# Patient Record
Sex: FEMALE | Race: WHITE | NOT HISPANIC OR LATINO | Employment: FULL TIME | URBAN - METROPOLITAN AREA
[De-identification: names, ages, dates, MRNs, and addresses within clinical notes are randomized per-mention and may not be internally consistent; named-entity substitution may affect disease eponyms.]

---

## 2019-06-19 ENCOUNTER — TELEPHONE (OUTPATIENT)
Dept: PEDIATRIC CARDIOLOGY | Facility: CLINIC | Age: 34
End: 2019-06-19

## 2019-07-15 ENCOUNTER — TELEPHONE (OUTPATIENT)
Dept: PEDIATRIC CARDIOLOGY | Facility: CLINIC | Age: 34
End: 2019-07-15

## 2019-07-15 NOTE — TELEPHONE ENCOUNTER
Contact: Bisi Woods-Pierron    Called to confirm patient's appointment with Dr. Santamaria/Fetal ECHO on 7/16/2019 at 1 pm. No answer/busy x 3.

## 2019-07-16 ENCOUNTER — OFFICE VISIT (OUTPATIENT)
Dept: PEDIATRIC CARDIOLOGY | Facility: CLINIC | Age: 34
End: 2019-07-16
Payer: COMMERCIAL

## 2019-07-16 ENCOUNTER — CLINICAL SUPPORT (OUTPATIENT)
Dept: PEDIATRIC CARDIOLOGY | Facility: CLINIC | Age: 34
End: 2019-07-16
Attending: OBSTETRICS & GYNECOLOGY
Payer: COMMERCIAL

## 2019-07-16 VITALS
SYSTOLIC BLOOD PRESSURE: 113 MMHG | DIASTOLIC BLOOD PRESSURE: 67 MMHG | BODY MASS INDEX: 26.39 KG/M2 | WEIGHT: 184.31 LBS | HEIGHT: 70 IN | OXYGEN SATURATION: 100 % | HEART RATE: 69 BPM

## 2019-07-16 DIAGNOSIS — E03.9 HYPOTHYROIDISM, UNSPECIFIED TYPE: Primary | ICD-10-CM

## 2019-07-16 DIAGNOSIS — O24.912 DIABETES MELLITUS AFFECTING PREGNANCY IN SECOND TRIMESTER: ICD-10-CM

## 2019-07-16 DIAGNOSIS — O24.012 PRE-EXISTING TYPE 1 DIABETES MELLITUS DURING PREGNANCY IN SECOND TRIMESTER: ICD-10-CM

## 2019-07-16 PROCEDURE — 76827 ECHO EXAM OF FETAL HEART: CPT | Mod: S$GLB,,, | Performed by: PEDIATRICS

## 2019-07-16 PROCEDURE — 99242 PR OFFICE CONSULTATION,LEVEL II: ICD-10-PCS | Mod: 25,S$GLB,, | Performed by: PEDIATRICS

## 2019-07-16 PROCEDURE — 99242 OFF/OP CONSLTJ NEW/EST SF 20: CPT | Mod: 25,S$GLB,, | Performed by: PEDIATRICS

## 2019-07-16 PROCEDURE — 93325 PR DOPPLER COLOR FLOW VELOCITY MAP: ICD-10-PCS | Mod: S$GLB,,, | Performed by: PEDIATRICS

## 2019-07-16 PROCEDURE — 76827 PR  SO2 FETAL HEART DOPPLER: ICD-10-PCS | Mod: S$GLB,,, | Performed by: PEDIATRICS

## 2019-07-16 PROCEDURE — 93325 DOPPLER ECHO COLOR FLOW MAPG: CPT | Mod: S$GLB,,, | Performed by: PEDIATRICS

## 2019-07-16 PROCEDURE — 76825 ECHO EXAM OF FETAL HEART: CPT | Mod: S$GLB,,, | Performed by: PEDIATRICS

## 2019-07-16 PROCEDURE — 76825 PR  SO2 FETAL HEART: ICD-10-PCS | Mod: S$GLB,,, | Performed by: PEDIATRICS

## 2019-07-16 RX ORDER — FOLIC ACID 0.4 MG
400 TABLET ORAL DAILY
COMMUNITY
End: 2019-12-02

## 2019-07-16 RX ORDER — NAPROXEN SODIUM 220 MG/1
81 TABLET, FILM COATED ORAL DAILY
COMMUNITY
End: 2019-12-02

## 2019-07-16 NOTE — PROGRESS NOTES
"Ms. Stone  is a 34 y.o. year old  , referred by Dr. Redding because of the history of diabetes mellitus and hypothyroidism.    The patient presented at approximately 25 1/7 weeks gestation (Patient's last menstrual period was 2018 (approximate).).  The patient denied any complaints.    Past medical history: Significant for type I DM and hypothyroidism.  Past surgical history: S/P tonsillectomy, S/P C/S x 1.  Past gestational history: S/P spontaneous  x 1.  The patient has a healthy daughter (3 y/o).    Family history: Negative for congenital heart disease, and sudden death during childhood.    Medications:   Outpatient Encounter Medications as of 2019   Medication Sig Dispense Refill    aspirin 81 MG Chew Take 81 mg by mouth once daily.      cholecalciferol, vitamin D3, 2,000 unit Cap Take 1 capsule by mouth once daily.      folic acid (FOLVITE) 400 MCG tablet Take 400 mcg by mouth once daily.      insulin aspart U-100 (NOVOLOG U-100 INSULIN ASPART) 100 unit/mL injection Inject 50 Units into the skin.      levothyroxine (SYNTHROID) 50 MCG tablet Take 75 mcg by mouth once daily.       ondansetron (ZOFRAN ODT) 4 MG TbDL Take 2 tablets (8 mg total) by mouth every 12 (twelve) hours. 20 tablet 6    BLISOVI FE , 28, 1 mg-20 mcg (21)/75 mg (7) per tablet Take 1 tablet by mouth once daily.  2     No facility-administered encounter medications on file as of 2019.        Allergies: Patient has no known allergies.    Blood pressure 113/67, pulse 69, height 5' 10" (1.778 m), weight 83.6 kg (184 lb 4.9 oz), last menstrual period 2018, SpO2 100 %, unknown if currently breastfeeding.    Fetal echocardiogram revealed a four chamber fetal heart with situs solitus.  The ventricles appeared to be equal in size.  The contractility of both ventricles was good.  The fetal heart rate was within the normal range, and regular.  The interventricular septum appeared to be intact.  There " were normally related great arteries seen.  The ductal and aortic arch were well visualized, and appeared to be widely patent.  There was no pleural or pericardial effusion seen.    Doppler analysis revealed a three vessel umbilical cord, with normal flow patterns, and velocities by Doppler.  There was a normal flow pattern seen in the ductus venosus.  There was evidence of normal systemic, and pulmonary venous return seen.  There was a normal right to left shunt seen across the foramen ovale.  There were normal inflow patterns seen across the AV-valves, without significant insufficiency.  There was no ventricular level shunt seen.  The right and left ventricular outflow tract, and ductal and aortic arch appeared to be unobstructed.    Impression:  It is our impression that Ms. Stone had a normal fetal echocardiogram.  As you know, small defects, and coarctation of the aorta cannot always be ruled out on fetal echocardiogram.  We discussed our findings with the patient, reviewed our images, and answered her questions. We also discussed the limitations of fetal echocardiography, but emphasized that her child appears to have normal cardiac anatomy.  No further follow up is scheduled in our clinic, but, of course, we will always be available to reevaluate this patient, if needed.    The above information was discussed in detail including the use of diagrams, with 30 minutes of total face to face time, with greater than 50% with counseling and coordination of care.  The discussion of the diagnosis and treatment options is as described above.      Time spent: 30 minutes, 50% dedicated to counseling.

## 2019-08-29 PROBLEM — E10.9 DM (DIABETES MELLITUS), TYPE 1: Status: ACTIVE | Noted: 2019-08-29

## 2019-09-05 PROBLEM — O24.919 DIABETES MELLITUS AFFECTING PREGNANCY: Status: ACTIVE | Noted: 2019-09-05

## 2019-09-12 PROBLEM — Z36.89 NST (NON-STRESS TEST) REACTIVE: Status: ACTIVE | Noted: 2019-09-12

## 2019-10-21 PROBLEM — O34.211 ENCOUNTER FOR MATERNAL CARE FOR LOW TRANSVERSE SCAR FROM PREVIOUS CESAREAN DELIVERY: Status: ACTIVE | Noted: 2019-10-21

## 2020-05-05 DIAGNOSIS — Z01.84 ANTIBODY RESPONSE EXAMINATION: ICD-10-CM

## 2021-05-04 ENCOUNTER — PATIENT MESSAGE (OUTPATIENT)
Dept: RESEARCH | Facility: HOSPITAL | Age: 36
End: 2021-05-04

## 2021-12-06 ENCOUNTER — TELEPHONE (OUTPATIENT)
Dept: FAMILY MEDICINE | Facility: CLINIC | Age: 36
End: 2021-12-06
Payer: COMMERCIAL

## 2021-12-06 RX ORDER — BENZONATATE 100 MG/1
100 CAPSULE ORAL 3 TIMES DAILY PRN
Qty: 30 CAPSULE | Refills: 0 | Status: SHIPPED | OUTPATIENT
Start: 2021-12-06 | End: 2021-12-16

## 2022-08-15 DIAGNOSIS — O24.912 DIABETES MELLITUS AFFECTING PREGNANCY IN SECOND TRIMESTER: Primary | ICD-10-CM

## 2022-09-09 ENCOUNTER — CLINICAL SUPPORT (OUTPATIENT)
Dept: PEDIATRIC CARDIOLOGY | Facility: CLINIC | Age: 37
End: 2022-09-09
Payer: COMMERCIAL

## 2022-09-09 ENCOUNTER — OFFICE VISIT (OUTPATIENT)
Dept: PEDIATRIC CARDIOLOGY | Facility: CLINIC | Age: 37
End: 2022-09-09
Attending: PEDIATRICS
Payer: COMMERCIAL

## 2022-09-09 VITALS
SYSTOLIC BLOOD PRESSURE: 112 MMHG | BODY MASS INDEX: 27.77 KG/M2 | HEART RATE: 81 BPM | OXYGEN SATURATION: 98 % | HEIGHT: 70 IN | DIASTOLIC BLOOD PRESSURE: 60 MMHG | WEIGHT: 194 LBS

## 2022-09-09 DIAGNOSIS — O24.012 PRE-EXISTING TYPE 1 DIABETES MELLITUS DURING PREGNANCY IN SECOND TRIMESTER: Primary | ICD-10-CM

## 2022-09-09 DIAGNOSIS — O24.912 DIABETES MELLITUS AFFECTING PREGNANCY IN SECOND TRIMESTER: ICD-10-CM

## 2022-09-09 PROCEDURE — 3066F PR DOCUMENTATION OF TREATMENT FOR NEPHROPATHY: ICD-10-PCS | Mod: CPTII,S$GLB,, | Performed by: PEDIATRICS

## 2022-09-09 PROCEDURE — 3074F SYST BP LT 130 MM HG: CPT | Mod: CPTII,S$GLB,, | Performed by: PEDIATRICS

## 2022-09-09 PROCEDURE — 99999 PR PBB SHADOW E&M-EST. PATIENT-LVL I: CPT | Mod: PBBFAC,,,

## 2022-09-09 PROCEDURE — 99999 PR PBB SHADOW E&M-EST. PATIENT-LVL I: ICD-10-PCS | Mod: PBBFAC,,,

## 2022-09-09 PROCEDURE — 93325 PR DOPPLER COLOR FLOW VELOCITY MAP: ICD-10-PCS | Mod: S$GLB,,, | Performed by: PEDIATRICS

## 2022-09-09 PROCEDURE — 76825 ECHO EXAM OF FETAL HEART: CPT | Mod: S$GLB,,, | Performed by: PEDIATRICS

## 2022-09-09 PROCEDURE — 3044F HG A1C LEVEL LT 7.0%: CPT | Mod: CPTII,S$GLB,, | Performed by: PEDIATRICS

## 2022-09-09 PROCEDURE — 3061F NEG MICROALBUMINURIA REV: CPT | Mod: CPTII,S$GLB,, | Performed by: PEDIATRICS

## 2022-09-09 PROCEDURE — 3066F NEPHROPATHY DOC TX: CPT | Mod: CPTII,S$GLB,, | Performed by: PEDIATRICS

## 2022-09-09 PROCEDURE — 3061F PR NEG MICROALBUMINURIA RESULT DOCUMENTED/REVIEW: ICD-10-PCS | Mod: CPTII,S$GLB,, | Performed by: PEDIATRICS

## 2022-09-09 PROCEDURE — 93325 DOPPLER ECHO COLOR FLOW MAPG: CPT | Mod: S$GLB,,, | Performed by: PEDIATRICS

## 2022-09-09 PROCEDURE — 1159F MED LIST DOCD IN RCRD: CPT | Mod: CPTII,S$GLB,, | Performed by: PEDIATRICS

## 2022-09-09 PROCEDURE — 3008F PR BODY MASS INDEX (BMI) DOCUMENTED: ICD-10-PCS | Mod: CPTII,S$GLB,, | Performed by: PEDIATRICS

## 2022-09-09 PROCEDURE — 3078F DIAST BP <80 MM HG: CPT | Mod: CPTII,S$GLB,, | Performed by: PEDIATRICS

## 2022-09-09 PROCEDURE — 99999 PR PBB SHADOW E&M-EST. PATIENT-LVL IV: ICD-10-PCS | Mod: PBBFAC,,, | Performed by: PEDIATRICS

## 2022-09-09 PROCEDURE — 3074F PR MOST RECENT SYSTOLIC BLOOD PRESSURE < 130 MM HG: ICD-10-PCS | Mod: CPTII,S$GLB,, | Performed by: PEDIATRICS

## 2022-09-09 PROCEDURE — 1159F PR MEDICATION LIST DOCUMENTED IN MEDICAL RECORD: ICD-10-PCS | Mod: CPTII,S$GLB,, | Performed by: PEDIATRICS

## 2022-09-09 PROCEDURE — 76827 PR  SO2 FETAL HEART DOPPLER: ICD-10-PCS | Mod: S$GLB,,, | Performed by: PEDIATRICS

## 2022-09-09 PROCEDURE — 76827 ECHO EXAM OF FETAL HEART: CPT | Mod: S$GLB,,, | Performed by: PEDIATRICS

## 2022-09-09 PROCEDURE — 3044F PR MOST RECENT HEMOGLOBIN A1C LEVEL <7.0%: ICD-10-PCS | Mod: CPTII,S$GLB,, | Performed by: PEDIATRICS

## 2022-09-09 PROCEDURE — 3078F PR MOST RECENT DIASTOLIC BLOOD PRESSURE < 80 MM HG: ICD-10-PCS | Mod: CPTII,S$GLB,, | Performed by: PEDIATRICS

## 2022-09-09 PROCEDURE — 99999 PR PBB SHADOW E&M-EST. PATIENT-LVL IV: CPT | Mod: PBBFAC,,, | Performed by: PEDIATRICS

## 2022-09-09 PROCEDURE — 76825 PR  SO2 FETAL HEART: ICD-10-PCS | Mod: S$GLB,,, | Performed by: PEDIATRICS

## 2022-09-09 PROCEDURE — 3008F BODY MASS INDEX DOCD: CPT | Mod: CPTII,S$GLB,, | Performed by: PEDIATRICS

## 2022-09-09 PROCEDURE — 99202 PR OFFICE/OUTPT VISIT, NEW, LEVL II, 15-29 MIN: ICD-10-PCS | Mod: 25,S$GLB,, | Performed by: PEDIATRICS

## 2022-09-09 PROCEDURE — 99202 OFFICE O/P NEW SF 15 MIN: CPT | Mod: 25,S$GLB,, | Performed by: PEDIATRICS

## 2022-09-09 RX ORDER — NAPROXEN SODIUM 220 MG/1
81 TABLET, FILM COATED ORAL DAILY
COMMUNITY
End: 2023-08-11

## 2022-09-09 RX ORDER — FOLIC ACID 1 MG/1
1 TABLET ORAL DAILY
COMMUNITY
End: 2023-08-11

## 2022-09-13 NOTE — PROGRESS NOTES
[facility]etal [facility] ([place]) Fetal Cardiology Clinic    Today, I had the pleasure of evaluating Bisi Stone who is now 37 y.o. and carrying her fourth pregnancy at 26 6/7 weeks gestation with an JENNA of 12/10/22. She was referred for evaluation of the fetal heart due pre-existing maternal diabetes mellitus type 1.      She is carrying a male  fetus, yet unnamed.  Pregnancy thus far has been otherwise uncomplicated.    Obstetric History:    .  Her OB history is notable for one prior SAB and two prior C-sections.     Past Medical History:   Diagnosis Date    Diabetes mellitus     type 1    Diabetes mellitus type I     Hypothyroidism     Retinopathy     Thyroid disease     hypo         Current Outpatient Medications:     aspirin 81 MG Chew, Take 81 mg by mouth once daily., Disp: , Rfl:     blood sugar diagnostic Strp, , Disp: , Rfl:     cholecalciferol, vitamin D3, 2,000 unit Cap, Take 1 capsule by mouth once daily., Disp: , Rfl:     folic acid (FOLVITE) 1 MG tablet, Take 1 mg by mouth once daily., Disp: , Rfl:     levothyroxine (SYNTHROID) 88 MCG tablet, Take 1 tablet (88 mcg total) by mouth once daily., Disp: 90 tablet, Rfl: 1    NOVOLOG U-100 INSULIN ASPART 100 unit/mL injection, INJECT 50 UNITS DAILY AS NEEDED PER INSULIN PUMP, Disp: , Rfl:     -IRON-FOLATE 1-DSS-DHA ORAL, Take 1 tablet by mouth once daily at 6am., Disp: , Rfl:     norethindrone-e.estradioL-iron (LO LOESTRIN FE) 1 mg-10 mcg (24)/10 mcg (2) Tab, Take 1 tablet by mouth once daily., Disp: , Rfl:     ondansetron (ZOFRAN ODT) 4 MG TbDL, Take 2 tablets (8 mg total) by mouth every 12 (twelve) hours. (Patient not taking: Reported on 2022), Disp: 30 tablet, Rfl: 6    valACYclovir (VALTREX) 500 MG tablet, TAKE 1 TABLET BY MOUTH EVERY DAY (Patient not taking: Reported on 2022), Disp: 90 tablet, Rfl: 3    Current Facility-Administered Medications:     acetaminophen tablet 650 mg, 650 mg, Oral, Once PRN, Reggie L.  MD Marc    albuterol inhaler 2 puff, 2 puff, Inhalation, Q20 Min PRN, Reggie Tran MD    diphenhydrAMINE injection 25 mg, 25 mg, Intravenous, Once PRN, Reggie Tran MD    EPINEPHrine (EPIPEN) 0.3 mg/0.3 mL pen injection 0.3 mg, 0.3 mg, Intramuscular, PRN, Reggie Tran MD    methylPREDNISolone sodium succinate injection 40 mg, 40 mg, Intravenous, Once PRN, Reggie Tran MD    ondansetron disintegrating tablet 4 mg, 4 mg, Oral, Once PRN, Reggie Tran MD    sodium chloride 0.9% 500 mL flush bag, , Intravenous, PRN, Reggie Tran MD    sodium chloride 0.9% flush 10 mL, 10 mL, Intravenous, PRN, Reggie Tran MD     Review of patient's allergies indicates:  No Known Allergies    Family History: Negative for congenital heart disease, early coronary artery disease, sudden unexplained death, connective tissues disorders, genetic syndromes, multiple miscarriages or other congenital anomalies.    Social History: Ms. Bisi Stone is  to the father of the baby.  The father of the baby is involved.     FETAL ECHOCARDIOGRAM (summary):  Fetal echocardiogram at 26 6/7 weeks gestational age, JENNA 12/10/2022.   Normal fetal echocardiogram.   (Full report in electronic medical record)    Impression:  Single active male fetus at 26 6/7 wga.  Maternal diabetes mellitus type 1. Normal fetal echocardiogram.      Todays fetal echocardiogram is normal, within the limitations of fetal echocardiography.  I discussed with her that fetal echocardiography is insufficiently sensitive to rule out all septal defects, anomalies of pulmonary and systemic veins, arch anomalies, and some valvar abnormalities, nor can it ensure that the ductus arteriosus and foramen ovale will spontaneously close.     Recommendations:  I discussed the continued importance of tight control of glucose levels throughout the remainder of the pregnancy given the concern of developing ventricular septal hypertrophy with poor  glucose control, particularly in the third trimester.     Location, timing, and mode of delivery will be determined by the obstetrical team.  She does not require further follow-up in the fetal echocardiography clinic, but I would be happy to see her again if additional questions or concerns arise.    Should there be any concerns about the baby's heart after birth, a post-marily echocardiogram and cardiology consultation are recommended.         Evelyne Kang MD, MSCI  Pediatric Cardiology  Pediatric Echocardiography, Fetal Echocardiography, Cardiac MRI  [facility] [address] [place]  [address]  Phone [phone], Fax [phone]        The above information was discussed in detail including the use of diagrams, with 30 minutes of total face to face time, with greater than 50% with counseling and coordination of care.  The discussion of the diagnosis and treatment options is as described above.

## 2022-10-25 PROBLEM — E13.9 DIABETES 1.5, MANAGED AS TYPE 1: Status: ACTIVE | Noted: 2019-08-29

## 2023-08-11 ENCOUNTER — OFFICE VISIT (OUTPATIENT)
Dept: ENDOCRINOLOGY | Facility: CLINIC | Age: 38
End: 2023-08-11
Payer: COMMERCIAL

## 2023-08-11 VITALS
SYSTOLIC BLOOD PRESSURE: 120 MMHG | WEIGHT: 171.31 LBS | DIASTOLIC BLOOD PRESSURE: 80 MMHG | OXYGEN SATURATION: 99 % | HEIGHT: 70 IN | HEART RATE: 77 BPM | BODY MASS INDEX: 24.52 KG/M2

## 2023-08-11 DIAGNOSIS — E10.3393 MODERATE NONPROLIFERATIVE DIABETIC RETINOPATHY OF BOTH EYES WITHOUT MACULAR EDEMA ASSOCIATED WITH TYPE 1 DIABETES MELLITUS: ICD-10-CM

## 2023-08-11 DIAGNOSIS — E10.9 TYPE 1 DIABETES MELLITUS WITHOUT COMPLICATION: Primary | ICD-10-CM

## 2023-08-11 DIAGNOSIS — E03.9 HYPOTHYROIDISM, UNSPECIFIED TYPE: ICD-10-CM

## 2023-08-11 PROBLEM — Z36.89 NST (NON-STRESS TEST) REACTIVE: Status: RESOLVED | Noted: 2019-09-12 | Resolved: 2023-08-11

## 2023-08-11 PROBLEM — O24.919 DIABETES MELLITUS AFFECTING PREGNANCY: Status: RESOLVED | Noted: 2019-09-05 | Resolved: 2023-08-11

## 2023-08-11 PROBLEM — E13.9 DIABETES 1.5, MANAGED AS TYPE 1: Status: RESOLVED | Noted: 2019-08-29 | Resolved: 2023-08-11

## 2023-08-11 PROBLEM — O24.912 DIABETES MELLITUS AFFECTING PREGNANCY IN SECOND TRIMESTER: Status: RESOLVED | Noted: 2019-07-16 | Resolved: 2023-08-11

## 2023-08-11 PROCEDURE — 3066F NEPHROPATHY DOC TX: CPT | Mod: CPTII,S$GLB,, | Performed by: INTERNAL MEDICINE

## 2023-08-11 PROCEDURE — 3008F BODY MASS INDEX DOCD: CPT | Mod: CPTII,S$GLB,, | Performed by: INTERNAL MEDICINE

## 2023-08-11 PROCEDURE — 3079F PR MOST RECENT DIASTOLIC BLOOD PRESSURE 80-89 MM HG: ICD-10-PCS | Mod: CPTII,S$GLB,, | Performed by: INTERNAL MEDICINE

## 2023-08-11 PROCEDURE — 1159F MED LIST DOCD IN RCRD: CPT | Mod: CPTII,S$GLB,, | Performed by: INTERNAL MEDICINE

## 2023-08-11 PROCEDURE — 99999 PR PBB SHADOW E&M-EST. PATIENT-LVL IV: CPT | Mod: PBBFAC,,, | Performed by: INTERNAL MEDICINE

## 2023-08-11 PROCEDURE — 99204 PR OFFICE/OUTPT VISIT, NEW, LEVL IV, 45-59 MIN: ICD-10-PCS | Mod: 25,S$GLB,, | Performed by: INTERNAL MEDICINE

## 2023-08-11 PROCEDURE — 1160F PR REVIEW ALL MEDS BY PRESCRIBER/CLIN PHARMACIST DOCUMENTED: ICD-10-PCS | Mod: CPTII,S$GLB,, | Performed by: INTERNAL MEDICINE

## 2023-08-11 PROCEDURE — 99204 OFFICE O/P NEW MOD 45 MIN: CPT | Mod: 25,S$GLB,, | Performed by: INTERNAL MEDICINE

## 2023-08-11 PROCEDURE — 3008F PR BODY MASS INDEX (BMI) DOCUMENTED: ICD-10-PCS | Mod: CPTII,S$GLB,, | Performed by: INTERNAL MEDICINE

## 2023-08-11 PROCEDURE — 3044F HG A1C LEVEL LT 7.0%: CPT | Mod: CPTII,S$GLB,, | Performed by: INTERNAL MEDICINE

## 2023-08-11 PROCEDURE — 95251 CONT GLUC MNTR ANALYSIS I&R: CPT | Mod: S$GLB,,, | Performed by: INTERNAL MEDICINE

## 2023-08-11 PROCEDURE — 3061F NEG MICROALBUMINURIA REV: CPT | Mod: CPTII,S$GLB,, | Performed by: INTERNAL MEDICINE

## 2023-08-11 PROCEDURE — 3074F PR MOST RECENT SYSTOLIC BLOOD PRESSURE < 130 MM HG: ICD-10-PCS | Mod: CPTII,S$GLB,, | Performed by: INTERNAL MEDICINE

## 2023-08-11 PROCEDURE — 3074F SYST BP LT 130 MM HG: CPT | Mod: CPTII,S$GLB,, | Performed by: INTERNAL MEDICINE

## 2023-08-11 PROCEDURE — 1159F PR MEDICATION LIST DOCUMENTED IN MEDICAL RECORD: ICD-10-PCS | Mod: CPTII,S$GLB,, | Performed by: INTERNAL MEDICINE

## 2023-08-11 PROCEDURE — 3061F PR NEG MICROALBUMINURIA RESULT DOCUMENTED/REVIEW: ICD-10-PCS | Mod: CPTII,S$GLB,, | Performed by: INTERNAL MEDICINE

## 2023-08-11 PROCEDURE — 3066F PR DOCUMENTATION OF TREATMENT FOR NEPHROPATHY: ICD-10-PCS | Mod: CPTII,S$GLB,, | Performed by: INTERNAL MEDICINE

## 2023-08-11 PROCEDURE — 1160F RVW MEDS BY RX/DR IN RCRD: CPT | Mod: CPTII,S$GLB,, | Performed by: INTERNAL MEDICINE

## 2023-08-11 PROCEDURE — 95251 PR GLUCOSE MONITOR, 72 HOUR, PHYS INTERP: ICD-10-PCS | Mod: S$GLB,,, | Performed by: INTERNAL MEDICINE

## 2023-08-11 PROCEDURE — 3044F PR MOST RECENT HEMOGLOBIN A1C LEVEL <7.0%: ICD-10-PCS | Mod: CPTII,S$GLB,, | Performed by: INTERNAL MEDICINE

## 2023-08-11 PROCEDURE — 3079F DIAST BP 80-89 MM HG: CPT | Mod: CPTII,S$GLB,, | Performed by: INTERNAL MEDICINE

## 2023-08-11 PROCEDURE — 99999 PR PBB SHADOW E&M-EST. PATIENT-LVL IV: ICD-10-PCS | Mod: PBBFAC,,, | Performed by: INTERNAL MEDICINE

## 2023-08-11 RX ORDER — NORETHINDRONE 0.35 MG/1
1 TABLET ORAL DAILY
COMMUNITY
Start: 2023-06-09 | End: 2023-12-01

## 2023-08-11 RX ORDER — LEVOTHYROXINE SODIUM 100 UG/1
100 TABLET ORAL DAILY
COMMUNITY
Start: 2023-07-27 | End: 2023-08-11

## 2023-08-11 RX ORDER — LEVOTHYROXINE SODIUM 88 UG/1
88 TABLET ORAL DAILY
Qty: 90 TABLET | Refills: 3 | Status: SHIPPED | OUTPATIENT
Start: 2023-08-11 | End: 2024-02-16 | Stop reason: SDUPTHER

## 2023-08-11 RX ORDER — INSULIN ASPART 100 [IU]/ML
INJECTION, SOLUTION INTRAVENOUS; SUBCUTANEOUS
Qty: 50 ML | Refills: 3 | Status: SHIPPED | OUTPATIENT
Start: 2023-08-11 | End: 2024-02-16 | Stop reason: SDUPTHER

## 2023-08-11 NOTE — ASSESSMENT & PLAN NOTE
Pump and sensor download reviewed and excellent control  Frequent afternoon lows so will change settings as below    Midnight 0.650 u/hr 1u:55 mg/dL 1u:8.0 g 110 mg/dL  5:30 AM 0.850 u/hr 1u:45 mg/dL 1u:6.5 g 110 mg/dL  7:00 AM 1.000 u/hr 1u:45 mg/dL 1u:6.5 g 110 mg/dL  10:00 AM 0.700 u/hr 1u:45 mg/dL 1u:6.5 g 110 mg/dL  12:00 PM 0.500 u/hr 1u:45 mg/dL 1u:6.5 g 110 mg/dL  2:30 PM 0.450 u/hr 1u:45 mg/dL 1u:6.5 g 110 mg/dL  10:30 PM 0.500 u/hr 1u:55 mg/dL 1u:8.0 g 110 mg/dL    Has back-up basal and glucagon

## 2023-08-11 NOTE — PROGRESS NOTES
Bisi Stone is a 38 y.o. female  referred by Aaareferral Self for evaluation of T1DM    History of Present Illness  T1DM  Diagnosed at age 4 yo  Known complications: retinopathy, worsened by pregnancy      Previously seen by Dr. Dickinson who is retiring    She has been on a pump since about age 16. Several different brands. Tried Medtronic 670 G and did not like. Now using Tslim with control IQ since 2020    Finds gets lows very predictably when leaves work around 4:30  Exercises in morning because otherwise sugar too low. Will see some rise and then a decline    Had UTI a couple of weeks ago. Initial symptom was elevated sugars followed by urinary symptoms. Now resolved    Current Diabetes Regimen:  Tslim with control IQ- novolog    Midnight 0.650 u/hr 1u:55 mg/dL 1u:8.0 g 110 mg/dL  5:30 AM 0.850 u/hr 1u:45 mg/dL 1u:6.5 g 110 mg/dL  7:00 AM 1.000 u/hr 1u:45 mg/dL 1u:6.5 g 110 mg/dL  10:00 AM 0.700 u/hr 1u:45 mg/dL 1u:6.5 g 110 mg/dL  12:00 PM 0.500 u/hr 1u:45 mg/dL 1u:6.5 g 110 mg/dL  6:00 PM 0.500 u/hr 1u:45 mg/dL 1u:6.5 g 110 mg/dL  10:30 PM 0.500 u/hr 1u:55 mg/dL 1u:8.0 g 110 mg/dL    Glucagon: baqsimi  Back-up basal: yes    Uses steel cannula  [employer] for supplies      Last Hgb A1C:  Lab Results   Component Value Date    HGBA1C 6.1 (H) 06/01/2023         Glucose Monitoring:  Meter/CGM: Dexcom with pump data      Hypoglycemic Episodes: usually in afternoon when leaving work    DKA: never    Screening / DM Complications:    Nephropathy:  ACEi/ARB: Not taking  Lab Results   Component Value Date    MICALBCREAT Unable to calculate 06/01/2023       Last Lipid Panel:  Statin: Not taking; statin for about 5 years and stopped for pregnancy. Planning to get coronary calcium age 41  Lab Results   Component Value Date    LDLCALC 64 06/01/2023       Last foot exam : 07/09/2021  Last eye exam : 03/28/2023;  no laser surgery or DR    Hypothyroidism  On LT4 88 mcg daily  Lab Results   Component Value Date    TSH  "4.09 06/01/2023       No results found for: "TTGIGA"    Aspirin: not taking      Pregnancy plans: Had baby 11/2022, tubal ligation at that time  Takes Susan for heavy bleeding          Current Outpatient Medications:     blood sugar diagnostic Strp, , Disp: , Rfl:     cholecalciferol, vitamin D3, 2,000 unit Cap, Take 1 capsule by mouth once daily., Disp: , Rfl:     SUSAN 0.35 mg tablet, Take 1 tablet by mouth once daily., Disp: , Rfl:     ondansetron (ZOFRAN ODT) 4 MG TbDL, Take 2 tablets (8 mg total) by mouth every 12 (twelve) hours., Disp: 30 tablet, Rfl: 6    levothyroxine (SYNTHROID) 88 MCG tablet, Take 1 tablet (88 mcg total) by mouth once daily., Disp: 90 tablet, Rfl: 3    NOVOLOG U-100 INSULIN ASPART 100 unit/mL injection, INJECT 50 UNITS DAILY AS NEEDED PER INSULIN PUMP, Disp: 50 mL, Rfl: 3    valACYclovir (VALTREX) 500 MG tablet, TAKE 1 TABLET BY MOUTH EVERY DAY (Patient not taking: Reported on 9/9/2022), Disp: 90 tablet, Rfl: 3  No current facility-administered medications for this visit.    ROS as above    Objective:     Vitals:    08/11/23 1135   BP: 120/80   Pulse: 77     Wt Readings from Last 3 Encounters:   08/11/23 77.7 kg (171 lb 4.8 oz)   11/28/22 90.7 kg (200 lb)   10/04/22 88.5 kg (195 lb)     Body mass index is 24.58 kg/m².  Physical Exam  Constitutional:       Appearance: She is well-developed.   HENT:      Head: Normocephalic.   Eyes:      Conjunctiva/sclera: Conjunctivae normal.   Pulmonary:      Effort: Pulmonary effort is normal.   Musculoskeletal:         General: Normal range of motion.      Comments: Pump site in thighs. Firm areas at previous pump sites but no significant lipohypertrophy   Skin:     General: Skin is warm.      Findings: No rash.   Neurological:      Mental Status: She is alert and oriented to person, place, and time.         Labs    Chemistry        Component Value Date/Time     06/01/2023 1541    K 4.5 06/01/2023 1541     06/01/2023 1541    CO2 24 " 06/01/2023 1541    BUN 16 06/01/2023 1541    CREATININE 0.90 06/01/2023 1541     (H) 06/01/2023 1541        Component Value Date/Time    CALCIUM 9.5 06/01/2023 1541    ALKPHOS 57 06/01/2023 1541    AST 32 06/01/2023 1541    ALT 21 06/01/2023 1541    BILITOT 0.7 06/01/2023 1541    ESTGFRAFRICA >60 04/14/2022 0810    EGFRNONAA >60 04/14/2022 0810              Assessment and Plan     Type 1 diabetes mellitus with retinopathy  Pump and sensor download reviewed and excellent control  Frequent afternoon lows so will change settings as below    Midnight 0.650 u/hr 1u:55 mg/dL 1u:8.0 g 110 mg/dL  5:30 AM 0.850 u/hr 1u:45 mg/dL 1u:6.5 g 110 mg/dL  7:00 AM 1.000 u/hr 1u:45 mg/dL 1u:6.5 g 110 mg/dL  10:00 AM 0.700 u/hr 1u:45 mg/dL 1u:6.5 g 110 mg/dL  12:00 PM 0.500 u/hr 1u:45 mg/dL 1u:6.5 g 110 mg/dL  2:30 PM 0.450 u/hr 1u:45 mg/dL 1u:6.5 g 110 mg/dL  10:30 PM 0.500 u/hr 1u:55 mg/dL 1u:8.0 g 110 mg/dL    Has back-up basal and glucagon    Moderate nonproliferative diabetic retinopathy of both eyes without macular edema associated with type 1 diabetes mellitus  Worsened during pregnancy  Regular eye exams and now stable    Hypothyroidism  TSH in normal range although upper end  Feels well  Repeat 1 month and if similar consider small increase in dose  Currently on LT4 88 mcg daily        RTC 6 months with A1c, CMP before  TSH 1 month        Dulce Maria Gomez MD

## 2023-08-11 NOTE — ASSESSMENT & PLAN NOTE
TSH in normal range although upper end  Feels well  Repeat 1 month and if similar consider small increase in dose  Currently on LT4 88 mcg daily

## 2023-08-11 NOTE — PATIENT INSTRUCTIONS
Our fax number is [phone]    I would like to see you for a follow-up visit in 6 months which will be in February of 2024. My schedule for then will open at the beginning of October so please set yourself a reminder to schedule a visit when it opens. The spots fill quickly so please do this at the beginning of the month. You can reach out to us for help with scheduling or book yourself on the portal.  Virtual next time      Tsh 1 month, other labs before visit

## 2023-09-19 ENCOUNTER — PATIENT MESSAGE (OUTPATIENT)
Dept: ENDOCRINOLOGY | Facility: CLINIC | Age: 38
End: 2023-09-19
Payer: COMMERCIAL

## 2023-09-20 ENCOUNTER — TELEPHONE (OUTPATIENT)
Dept: ENDOCRINOLOGY | Facility: CLINIC | Age: 38
End: 2023-09-20
Payer: COMMERCIAL

## 2023-10-01 ENCOUNTER — PATIENT MESSAGE (OUTPATIENT)
Dept: ENDOCRINOLOGY | Facility: CLINIC | Age: 38
End: 2023-10-01
Payer: COMMERCIAL

## 2024-01-11 ENCOUNTER — PATIENT MESSAGE (OUTPATIENT)
Dept: ENDOCRINOLOGY | Facility: CLINIC | Age: 39
End: 2024-01-11
Payer: COMMERCIAL

## 2024-01-11 DIAGNOSIS — E10.9 TYPE 1 DIABETES MELLITUS WITHOUT COMPLICATION: Primary | ICD-10-CM

## 2024-01-11 DIAGNOSIS — E03.9 HYPOTHYROIDISM, UNSPECIFIED TYPE: ICD-10-CM

## 2024-02-16 ENCOUNTER — OFFICE VISIT (OUTPATIENT)
Dept: ENDOCRINOLOGY | Facility: CLINIC | Age: 39
End: 2024-02-16
Payer: COMMERCIAL

## 2024-02-16 DIAGNOSIS — E10.319 TYPE 1 DIABETES MELLITUS WITH RETINOPATHY, MACULAR EDEMA PRESENCE UNSPECIFIED, UNSPECIFIED LATERALITY, UNSPECIFIED RETINOPATHY SEVERITY: Primary | ICD-10-CM

## 2024-02-16 DIAGNOSIS — E10.9 TYPE 1 DIABETES MELLITUS WITHOUT COMPLICATION: ICD-10-CM

## 2024-02-16 DIAGNOSIS — E10.3393 MODERATE NONPROLIFERATIVE DIABETIC RETINOPATHY OF BOTH EYES WITHOUT MACULAR EDEMA ASSOCIATED WITH TYPE 1 DIABETES MELLITUS: ICD-10-CM

## 2024-02-16 DIAGNOSIS — E03.9 HYPOTHYROIDISM, UNSPECIFIED TYPE: ICD-10-CM

## 2024-02-16 PROBLEM — O34.211 ENCOUNTER FOR MATERNAL CARE FOR LOW TRANSVERSE SCAR FROM PREVIOUS CESAREAN DELIVERY: Status: RESOLVED | Noted: 2019-10-21 | Resolved: 2024-02-16

## 2024-02-16 PROCEDURE — 95251 CONT GLUC MNTR ANALYSIS I&R: CPT | Mod: NDTC,S$GLB,, | Performed by: INTERNAL MEDICINE

## 2024-02-16 PROCEDURE — 1159F MED LIST DOCD IN RCRD: CPT | Mod: CPTII,95,, | Performed by: INTERNAL MEDICINE

## 2024-02-16 PROCEDURE — 1160F RVW MEDS BY RX/DR IN RCRD: CPT | Mod: CPTII,95,, | Performed by: INTERNAL MEDICINE

## 2024-02-16 PROCEDURE — 3044F HG A1C LEVEL LT 7.0%: CPT | Mod: CPTII,95,, | Performed by: INTERNAL MEDICINE

## 2024-02-16 PROCEDURE — 99214 OFFICE O/P EST MOD 30 MIN: CPT | Mod: 25,95,, | Performed by: INTERNAL MEDICINE

## 2024-02-16 RX ORDER — GLUCAGON 3 MG/1
1 POWDER NASAL
Qty: 2 EACH | Refills: 3 | Status: SHIPPED | OUTPATIENT
Start: 2024-02-16

## 2024-02-16 RX ORDER — LEVOTHYROXINE SODIUM 88 UG/1
88 TABLET ORAL DAILY
Qty: 90 TABLET | Refills: 3 | Status: SHIPPED | OUTPATIENT
Start: 2024-02-16

## 2024-02-16 RX ORDER — INSULIN ASPART 100 [IU]/ML
INJECTION, SOLUTION INTRAVENOUS; SUBCUTANEOUS
Qty: 50 ML | Refills: 3 | Status: SHIPPED | OUTPATIENT
Start: 2024-02-16

## 2024-02-16 NOTE — ASSESSMENT & PLAN NOTE
Pump and sensor download reviewed and with more variability in last few weeks but still overall excellent control  Will have more usual routine in coming week and will review data then to determine need for changes  A1c at goal  Change to G7  Discussed Mobi pump as option as due for new pump soon      Has access to back-up basal, resent glucagon

## 2024-02-16 NOTE — PROGRESS NOTES
Bisi WoodsWhitley is a 39 y.o. female presenting for follow-up of T1DM    The patient location is: [place]  The chief complaint leading to consultation is:   Chief Complaint   Patient presents with    Diabetes       Visit type: audiovisual    Face to Face time with patient: 20 minutes  30 minutes of total time spent on the encounter, which includes face to face time and non-face to face time preparing to see the patient (eg, review of tests), Obtaining and/or reviewing separately obtained history, Documenting clinical information in the electronic or other health record, Independently interpreting results (not separately reported) and communicating results to the patient/family/caregiver, or Care coordination (not separately reported).    Each patient to whom he or she provides medical services by telemedicine is:  (1) informed of the relationship between the physician and patient and the respective role of any other health care provider with respect to management of the patient; and (2) notified that he or she may decline to receive medical services by telemedicine and may withdraw from such care at any time.      History of Present Illness  T1DM  Diagnosed at age 4 yo  Known complications: retinopathy, worsened by pregnancy    She has been on a pump since about age 16. Several different brands. Tried Medtronic 670 G and did not like. Now using Tslim with control IQ since 2020. May be due for pump upgrade soon    Life has been more chaotic recently and with this more glycemic variability  [place] also led to more highs and lows but getting back to more usual routine now    Will get endometrial ablation soon as still heavy bleeding      Would like to change to G7   Current Diabetes Regimen:  Tslim with control IQ- novolog  Midnight 0.650 u/hr 1u:55 mg/dL 1u:8.0 g 110 mg/dL  5:30 AM 0.850 u/hr 1u:45 mg/dL 1u:6.5 g 110 mg/dL  7:00 AM 1.000 u/hr 1u:45 mg/dL 1u:6.5 g 110 mg/dL  10:00 AM 0.700 u/hr 1u:45 mg/dL 1u:6.5  "g 110 mg/dL  12:00 PM 0.500 u/hr 1u:45 mg/dL 1u:6.5 g 110 mg/dL  2:30 PM 0.450 u/hr 1u:45 mg/dL 1u:6.5 g 110 mg/dL  10:30 PM 0.500 u/hr 1u:55 mg/dL 1u:8.0 g 110 mg/dL    Glucagon: baqsimi  Back-up basal: yes    Uses steel cannula  [employer] for supplies      Last Hgb A1C:  Lab Results   Component Value Date    HGBA1C 6.3 (H) 01/26/2024         Glucose Monitoring:  Meter/CGM: Dexcom with pump data      Hypoglycemic Episodes: often overnight after high basal to correct hyperglycemia    DKA: never    Screening / DM Complications:    Nephropathy:  ACEi/ARB: Not taking  Lab Results   Component Value Date    MICALBCREAT Unable to calculate 06/01/2023       Last Lipid Panel:  Statin: Not taking; statin for about 5 years and stopped for pregnancy. Planning to get coronary calcium age 41  Lab Results   Component Value Date    LDLCALC 64 06/01/2023       Last foot exam : 07/09/2021  Last eye exam : moderate retinopathy; 2/2024    Hypothyroidism  On LT4 88 mcg daily  Lab Results   Component Value Date    TSH 1.42 01/26/2024    TSH 1.42 01/26/2024       No results found for: "TTGIGA"    Aspirin: not taking      Pregnancy plans: Had baby 11/2022, tubal ligation at that time  Takes Susan for heavy bleeding          Current Outpatient Medications:     blood sugar diagnostic Strp, , Disp: , Rfl:     cholecalciferol, vitamin D3, 2,000 unit Cap, Take 1 capsule by mouth once daily., Disp: , Rfl:     EScitalopram oxalate (LEXAPRO) 10 MG tablet, Take 1 tablet (10 mg total) by mouth once daily., Disp: 90 tablet, Rfl: 3    glucagon (BAQSIMI) 3 mg/actuation Spry, 1 each by Nasal route as needed., Disp: 2 each, Rfl: 3    levothyroxine (SYNTHROID) 88 MCG tablet, Take 1 tablet (88 mcg total) by mouth once daily., Disp: 90 tablet, Rfl: 3    NOVOLOG U-100 INSULIN ASPART 100 unit/mL injection, INJECT 50 UNITS DAILY AS NEEDED PER INSULIN PUMP, Disp: 50 mL, Rfl: 3    ondansetron (ZOFRAN ODT) 4 MG TbDL, Take 2 tablets (8 mg total) by mouth every 12 " (twelve) hours., Disp: 30 tablet, Rfl: 6    valACYclovir (VALTREX) 500 MG tablet, Take 1 tablet (500 mg total) by mouth once daily., Disp: 90 tablet, Rfl: 3    ROS as above    Objective:     There were no vitals filed for this visit.  Wt Readings from Last 3 Encounters:   12/01/23 1052 78.5 kg (173 lb)   08/11/23 1135 77.7 kg (171 lb 4.8 oz)   11/28/22 0527 90.7 kg (200 lb)         There is no height or weight on file to calculate BMI.      Labs    Chemistry        Component Value Date/Time     01/26/2024 1122    K 5.1 01/26/2024 1122     01/26/2024 1122    CO2 29 01/26/2024 1122    BUN 19 (H) 01/26/2024 1122    CREATININE 0.84 01/26/2024 1122    GLU 73 (L) 01/26/2024 1122        Component Value Date/Time    CALCIUM 9.4 01/26/2024 1122    ALKPHOS 55 01/26/2024 1122    AST 31 01/26/2024 1122    ALT 21 01/26/2024 1122    BILITOT 0.6 01/26/2024 1122    ESTGFRAFRICA >60 04/14/2022 0810    EGFRNONAA >60 04/14/2022 0810              Assessment and Plan     Type 1 diabetes mellitus with retinopathy  Pump and sensor download reviewed and with more variability in last few weeks but still overall excellent control  Will have more usual routine in coming week and will review data then to determine need for changes  A1c at goal  Change to G7  Discussed Mobi pump as option as due for new pump soon      Has access to back-up basal, resent glucagon    Hypothyroidism  Clinically and biochemically euthyroid.  Cont LT4 88 mcg daily    Moderate nonproliferative diabetic retinopathy of both eyes without macular edema associated with type 1 diabetes mellitus  Worsened during pregnancy  Regular eye exams and now stable          RTC 6 months with labs before        Dulce Maria Gomez MD

## 2024-03-01 PROBLEM — N92.0 MENORRHAGIA: Status: ACTIVE | Noted: 2024-03-01

## 2024-03-07 ENCOUNTER — PATIENT MESSAGE (OUTPATIENT)
Dept: ENDOCRINOLOGY | Facility: CLINIC | Age: 39
End: 2024-03-07
Payer: COMMERCIAL

## 2024-03-13 ENCOUNTER — PATIENT MESSAGE (OUTPATIENT)
Dept: ENDOCRINOLOGY | Facility: CLINIC | Age: 39
End: 2024-03-13
Payer: COMMERCIAL

## 2024-03-13 DIAGNOSIS — E10.9 TYPE 1 DIABETES MELLITUS WITHOUT COMPLICATION: Primary | ICD-10-CM

## 2024-03-13 RX ORDER — BLOOD-GLUCOSE TRANSMITTER
1 EACH MISCELLANEOUS
Qty: 1 EACH | Refills: 3 | Status: SHIPPED | OUTPATIENT
Start: 2024-03-13 | End: 2025-03-13

## 2024-03-13 RX ORDER — BLOOD-GLUCOSE SENSOR
1 EACH MISCELLANEOUS
Qty: 3 EACH | Refills: 11 | Status: SHIPPED | OUTPATIENT
Start: 2024-03-13 | End: 2025-03-13

## 2024-03-27 NOTE — TELEPHONE ENCOUNTER
----- Message from Elvia Bowman sent at 9/20/2023 10:22 AM CDT -----  Regarding: Medical Supply  Contact: Hari@ [phone]  Hari calling from [facility] in [place] checking the the status of Dexcom Transmitter and Sensory. Caller ludwin she faxed paper work over on 9/18/2023 Please Fax to : [phone] reference# [id number]     Problem list     Subjective   Lacey Lui is a 68 y.o. female     Chief Complaint   Patient presents with    Follow-up     YEARLY     Problem List:  1. Chest Pain  1.1 stress test 1/13/2023-small, moderate dense reversible defect involving the distal anteroseptal wall and apex compatible with ischemia, EF 52% with septal hypokinesis  1. [facility] March 2023 with no significant CAD documented and normal systolic fxn  2. Hypertension  3. Hyperlipidemia   4. Shortness of breath  5. Diabetes mellitus II   6. Hypothyroidism      HPI    Patient is a 68-year-old female who presents back to the office for follow-up.  Patient has felt remarkably well.  She has no chest pain or pressure.  No shortness of breath.  No complaints of PND or orthopnea.    She does not describe palpitating nor does she complain of dysrhythmic symptoms.  She feels remarkably well at this time.      Current Outpatient Medications on File Prior to Visit   Medication Sig Dispense Refill    atorvastatin (LIPITOR) 10 MG tablet Take 1 tablet by mouth Daily.      busPIRone (BUSPAR) 10 MG tablet Take 1 tablet by mouth 2 (Two) Times a Day.      cetirizine (zyrTEC) 5 MG tablet Take 1 tablet by mouth Daily.      hydroCHLOROthiazide (HYDRODIURIL) 25 MG tablet Take 1 tablet by mouth Every Morning.      levothyroxine (SYNTHROID, LEVOTHROID) 25 MCG tablet Take 1 tablet by mouth Daily. on an empty stomach      losartan (Cozaar) 50 MG tablet Take 1 tablet by mouth Daily. 90 tablet 1    nitroglycerin (NITROSTAT) 0.4 MG SL tablet 1 under the tongue as needed for angina, may repeat q5mins for up three doses 30 tablet 5    omeprazole (priLOSEC) 40 MG capsule Take 1 capsule by mouth Daily.      oxybutynin XL (DITROPAN XL) 15 MG 24 hr tablet Take 1 tablet by mouth Daily.      propranolol (INDERAL) 60 MG tablet Take 2 tablets by mouth Daily.      vitamin D (ERGOCALCIFEROL) 1.25 MG (99073 UT) capsule capsule Take 1 capsule by mouth 1 (One) Time Per Week. Sunday       [DISCONTINUED] aspirin 81 MG EC tablet Take 1 tablet by mouth Daily. 90 tablet 3    [DISCONTINUED] Dulaglutide (TRULICITY SC) Inject 0.75 mg under the skin into the appropriate area as directed 1 (One) Time Per Week.       No current facility-administered medications on file prior to visit.       Sulfa antibiotics    Past Medical History:   Diagnosis Date    Bronchitis     COVID-19     09/2021    Diabetes mellitus     Hyperlipidemia     Hypertension     Loss of muscle tone of bladder     Vitamin A deficiency        Social History     Socioeconomic History    Marital status:    Tobacco Use    Smoking status: Never    Smokeless tobacco: Never   Vaping Use    Vaping status: Never Used   Substance and Sexual Activity    Alcohol use: Never    Drug use: Defer    Sexual activity: Defer       Family History   Problem Relation Age of Onset    Heart disease Mother     Hypertension Mother     Heart attack Mother     Hypertension Father     Prostate cancer Father     Skin cancer Father     Glaucoma Father        Review of Systems   Constitutional: Negative.  Negative for activity change, appetite change, chills, fatigue and fever.   HENT: Negative.  Negative for congestion and sinus pain.    Eyes: Negative.  Negative for visual disturbance.   Respiratory: Negative.  Negative for apnea, cough, chest tightness, shortness of breath and wheezing.    Cardiovascular: Negative.  Negative for chest pain, palpitations and leg swelling.   Gastrointestinal: Negative.  Negative for blood in stool.   Endocrine: Negative.  Negative for cold intolerance and heat intolerance.   Genitourinary:  Negative for hematuria.   Musculoskeletal: Negative.  Negative for gait problem.   Skin: Negative.  Negative for color change, rash and wound.   Allergic/Immunologic: Negative.  Negative for environmental allergies and food allergies.   Neurological:  Negative for dizziness, syncope, weakness, light-headedness, numbness and headaches.    Hematological: Negative.  Does not bruise/bleed easily.   Psychiatric/Behavioral: Negative.  Negative for sleep disturbance.        Objective   Vitals:    03/27/24 0932   BP: 139/88   BP Location: Left arm   Patient Position: Sitting   Cuff Size: Adult   Pulse: 70   SpO2: 97%   Weight: 98.9 kg (218 lb)      /88 (BP Location: Left arm, Patient Position: Sitting, Cuff Size: Adult)   Pulse 70   Wt 98.9 kg (218 lb)   SpO2 97%   BMI 42.58 kg/m²     Lab Results (most recent)       None            Physical Exam  Vitals and nursing note reviewed.   Constitutional:       General: She is not in acute distress.     Appearance: Normal appearance. She is well-developed.   HENT:      Head: Normocephalic and atraumatic.   Eyes:      General: No scleral icterus.        Right eye: No discharge.         Left eye: No discharge.      Conjunctiva/sclera: Conjunctivae normal.   Neck:      Vascular: No carotid bruit.   Cardiovascular:      Rate and Rhythm: Normal rate and regular rhythm.      Heart sounds: Normal heart sounds. No murmur heard.     No friction rub. No gallop.   Pulmonary:      Effort: Pulmonary effort is normal. No respiratory distress.      Breath sounds: Normal breath sounds. No wheezing or rales.   Chest:      Chest wall: No tenderness.   Musculoskeletal:      Right lower leg: No edema.      Left lower leg: No edema.   Skin:     General: Skin is warm and dry.      Coloration: Skin is not pale.      Findings: No erythema or rash.   Neurological:      Mental Status: She is alert and oriented to person, place, and time.      Cranial Nerves: No cranial nerve deficit.   Psychiatric:         Behavior: Behavior normal.         Procedure     ECG 12 Lead    Date/Time: 3/27/2024 10:31 AM  Performed by: Sal George PA    Authorized by: Sal George PA  Comparison: compared with previous ECG from 8/24/2022  Similar to previous ECG  Comments: EKG demonstrates sinus rhythm at 70 bpm with low voltage and no  acute ST changes             Assessment & Plan     Problems Addressed this Visit          Cardiac and Vasculature    Hyperlipidemia    Primary hypertension - Primary       Symptoms and Signs    Lower extremity edema     Diagnoses         Codes Comments    Primary hypertension    -  Primary ICD-10-CM: I10  ICD-9-CM: 401.9     Hyperlipidemia, unspecified hyperlipidemia type     ICD-10-CM: E78.5  ICD-9-CM: 272.4     Lower extremity edema     ICD-10-CM: R60.0  ICD-9-CM: 782.3             Recommendation  1.  Patient is a 68-year-old female presenting back to the office for routine cardiac assessment.  She has done remarkably well without any angina, failure, or arrhythmia.  For now, we will monitor.    2.  Patient with baseline hypertension doing well on medical therapy.  I will make no change.    3.  Patient dyslipidemia on statin therapy.  She has labs routinely followed by primary.    4.  Patient with occasional lower extremity edema on diuretic therapy.  It is currently manageable and she feels well.  We will make no changes but see her back in a year or sooner if needed.  Follow-up with primary as scheduled.         Patient brought in medicine list to appointment, it's been reviewed with patient and med list was updated in the chart.      Advance Care Planning   ACP discussion was declined by the patient. Patient does not have an advance directive, information provided.      Electronically signed by:

## 2024-07-03 ENCOUNTER — PATIENT MESSAGE (OUTPATIENT)
Dept: ENDOCRINOLOGY | Facility: CLINIC | Age: 39
End: 2024-07-03
Payer: COMMERCIAL

## 2024-07-17 ENCOUNTER — PATIENT MESSAGE (OUTPATIENT)
Dept: ENDOCRINOLOGY | Facility: CLINIC | Age: 39
End: 2024-07-17
Payer: COMMERCIAL

## 2024-07-17 DIAGNOSIS — E10.9 TYPE 1 DIABETES MELLITUS WITHOUT COMPLICATION: Primary | ICD-10-CM

## 2024-07-17 DIAGNOSIS — E03.9 HYPOTHYROIDISM, UNSPECIFIED TYPE: ICD-10-CM

## 2024-08-16 ENCOUNTER — OFFICE VISIT (OUTPATIENT)
Dept: ENDOCRINOLOGY | Facility: CLINIC | Age: 39
End: 2024-08-16
Payer: COMMERCIAL

## 2024-08-16 ENCOUNTER — PATIENT MESSAGE (OUTPATIENT)
Dept: ENDOCRINOLOGY | Facility: CLINIC | Age: 39
End: 2024-08-16

## 2024-08-16 DIAGNOSIS — E10.319 TYPE 1 DIABETES MELLITUS WITH RETINOPATHY, MACULAR EDEMA PRESENCE UNSPECIFIED, UNSPECIFIED LATERALITY, UNSPECIFIED RETINOPATHY SEVERITY: Primary | ICD-10-CM

## 2024-08-16 DIAGNOSIS — E10.3393 MODERATE NONPROLIFERATIVE DIABETIC RETINOPATHY OF BOTH EYES WITHOUT MACULAR EDEMA ASSOCIATED WITH TYPE 1 DIABETES MELLITUS: ICD-10-CM

## 2024-08-16 DIAGNOSIS — N92.4 EXCESSIVE BLEEDING IN PREMENOPAUSAL PERIOD: ICD-10-CM

## 2024-08-16 DIAGNOSIS — Z82.49 FAMILY HISTORY OF EARLY CAD: ICD-10-CM

## 2024-08-16 DIAGNOSIS — E03.9 HYPOTHYROIDISM, UNSPECIFIED TYPE: ICD-10-CM

## 2024-08-16 RX ORDER — ROSUVASTATIN CALCIUM 10 MG/1
10 TABLET, COATED ORAL DAILY
Qty: 90 TABLET | Refills: 3 | Status: SHIPPED | OUTPATIENT
Start: 2024-08-16 | End: 2025-08-16

## 2024-08-16 NOTE — PROGRESS NOTES
Bisi WoodsMarionAdams is a 39 y.o. female presenting for follow-up of T1DM    The patient location is: [place]  The chief complaint leading to consultation is:   Chief Complaint   Patient presents with    Diabetes       Visit type: audiovisual    Face to Face time with patient: 25 minutes  45 minutes of total time spent on the encounter, which includes face to face time and non-face to face time preparing to see the patient (eg, review of tests), Obtaining and/or reviewing separately obtained history, Documenting clinical information in the electronic or other health record, Independently interpreting results (not separately reported) and communicating results to the patient/family/caregiver, or Care coordination (not separately reported).    Each patient to whom he or she provides medical services by telemedicine is:  (1) informed of the relationship between the physician and patient and the respective role of any other health care provider with respect to management of the patient; and (2) notified that he or she may decline to receive medical services by telemedicine and may withdraw from such care at any time.      History of Present Illness  T1DM  Diagnosed at age 4 yo  Known complications: retinopathy, worsened by pregnancy    She has been on a pump since about age 16. Several different brands. Tried Medtronic 670 G and did not like.     Since last visit has changed to Mobi with G6, has G7 and will switch soon  Had issues with battery draining but reinstalled juliette and that fixed it  If doesn't have phone will not know glucose but otherwise very happy with pump  Likes small size, usually not using patch      Fall lots of increased stress. Started lexapro when stopped breastfeeding. Gained 10 pounds quickly   Had endometrial ablation. Felt very unmotivated while on lexapro so worked with PCP to wean off. She feels great now  Lost about 6 or so pounds  No bleeding after ablation    They have personally had a difficult  "last few months with her father having  unexpectedly of MI and her 's father being diagnosed with metastatic lung cancer    Strong family history of heart disease  She had calcium score of 0 as well as lipids which were not concerning but would like to discuss possibly starting statin    Some lows overnight and in evening. Severe lows last few nights  Schedule changing with kids going back to school    Current Diabetes Regimen:  Tslim with control IQ- novolog  Midnight 0.650 u/hr 1u:65 mg/dL 1u:8.0 g 110 mg/dL  5:30 AM 0.850 u/hr 1u:45 mg/dL 1u:6.5 g 110 mg/dL  7:00 AM 1.000 u/hr 1u:45 mg/dL 1u:6.5 g 110 mg/dL  10:00 AM 0.700 u/hr 1u:45 mg/dL 1u:6.5 g 110 mg/dL  12:00 PM 0.500 u/hr 1u:45 mg/dL 1u:6.5 g 110 mg/dL  2:30 PM 0.450 u/hr 1u:45 mg/dL 1u:6.5 g 110 mg/dL  10:30 PM 0.550 u/hr 1u:65 mg/dL 1u:8.0 g 110 mg/dL whether    Glucagon: baqsimi  Back-up basal: yes    Uses steel cannula      Last Hgb A1C:  Lab Results   Component Value Date    HGBA1C 6.1 (H) 08/15/2024         Glucose Monitoring:  Meter/CGM: Dexcom with pump data      Hypoglycemic Episodes: some severe overnight lows as well as evening     DKA: never    Screening / DM Complications:    Nephropathy:  ACEi/ARB: Not taking  Lab Results   Component Value Date    MICALBCREAT Unable to calculate 08/15/2024       Last Lipid Panel:  Statin: Not taking; statin for about 5 years and stopped for pregnancy.   Strong family history of heart disease including death of her father who had severe disease  2024  CAC 0  Lpa 32 (<75)  ApoB 60 (0-79)  LDL 76  HsCRP 2.59 (1-3)  Lab Results   Component Value Date    LDLCALC 76 08/15/2024       Last foot exam Most Recent Foot Exam Date: Not Found  Last eye exam : moderate retinopathy; 2024  Next week has appointment     Hypothyroidism  On LT4 88 mcg daily  Lab Results   Component Value Date    TSH 2.49 08/15/2024       No results found for: "TTGIGA"    Aspirin: not taking      Pregnancy plans: Had baby 2022, " tubal ligation at that time and later had endometrial ablation    Nurse checked vitals yesterday:   Weight yesterday 175lb  /74    Will have foot exam 12/2024 with PCP      Current Outpatient Medications:     blood sugar diagnostic Strp, , Disp: , Rfl:     cholecalciferol, vitamin D3, 2,000 unit Cap, Take 1 capsule by mouth once daily., Disp: , Rfl:     glucagon (BAQSIMI) 3 mg/actuation Spry, 1 each by Nasal route as needed., Disp: 2 each, Rfl: 3    levothyroxine (SYNTHROID) 88 MCG tablet, Take 1 tablet (88 mcg total) by mouth once daily., Disp: 90 tablet, Rfl: 3    NOVOLOG U-100 INSULIN ASPART 100 unit/mL injection, INJECT 50 UNITS DAILY AS NEEDED PER INSULIN PUMP, Disp: 50 mL, Rfl: 3    ondansetron (ZOFRAN ODT) 4 MG TbDL, Take 2 tablets (8 mg total) by mouth every 12 (twelve) hours., Disp: 30 tablet, Rfl: 6    rosuvastatin (CRESTOR) 10 MG tablet, Take 1 tablet (10 mg total) by mouth once daily., Disp: 90 tablet, Rfl: 3    valACYclovir (VALTREX) 500 MG tablet, Take 1 tablet (500 mg total) by mouth once daily., Disp: 90 tablet, Rfl: 3    ROS as above    Objective:     There were no vitals filed for this visit.  Wt Readings from Last 3 Encounters:   03/01/24 0523 78.5 kg (173 lb)   02/16/24 1255 79.4 kg (175 lb)   12/01/23 1052 78.5 kg (173 lb)         There is no height or weight on file to calculate BMI.      Labs    Chemistry        Component Value Date/Time     08/15/2024 0800    K 4.7 08/15/2024 0800     08/15/2024 0800    CO2 27 08/15/2024 0800    BUN 14 08/15/2024 0800    CREATININE 0.92 08/15/2024 0800     (H) 08/15/2024 0800        Component Value Date/Time    CALCIUM 9.4 08/15/2024 0800    ALKPHOS 50 08/15/2024 0800    AST 27 08/15/2024 0800    ALT 16 08/15/2024 0800    BILITOT 0.7 08/15/2024 0800    ESTGFRAFRICA >60 04/14/2022 0810    EGFRNONAA >60 04/14/2022 0810              Assessment and Plan     Type 1 diabetes mellitus with retinopathy  Pump and sensor download reviewed and  with some lows in evening and overnight likely due to recent change in schedule with kids resuming school  Using mobi and happy with system. Will start G7 soon    Will make the following changes to settings:  Change 2:30 PM carb ratio to 1:7.5    Basal rate changes  MN 0.475  10:30 PM 0.550    Labs excellent  She will have eye and foot exams soon    Hypothyroidism  Clinically and biochemically euthyroid.  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

## 2024-08-16 NOTE — PATIENT INSTRUCTIONS
Change 2:30 PM carb ratio to 1:7.5    Basal rate changes  MN 0.475  10:30 PM 0.550    I would like to see you for a follow-up visit in 6 months which will be in February of 2025. My schedule for then will open at the beginning of October     I sent crestor 10 mg to your pharmacy

## 2024-08-16 NOTE — ASSESSMENT & PLAN NOTE
Pump and sensor download reviewed and with some lows in evening and overnight likely due to recent change in schedule with kids resuming school  Using mobi and happy with system. Will start G7 soon    Will make the following changes to settings:  Change 2:30 PM carb ratio to 1:7.5    Basal rate changes  MN 0.475  10:30 PM 0.550    Labs excellent  She will have eye and foot exams soon

## 2024-08-16 NOTE — ASSESSMENT & PLAN NOTE
Discussed statin in setting of family history, long-standing Dm  She has tolerated well in past and given potential benefit will start crestor 10 mg daily

## 2024-11-11 DIAGNOSIS — E10.9 TYPE 1 DIABETES MELLITUS WITHOUT COMPLICATION: ICD-10-CM

## 2024-11-11 RX ORDER — INSULIN ASPART 100 [IU]/ML
INJECTION, SOLUTION INTRAVENOUS; SUBCUTANEOUS
Qty: 50 ML | Refills: 3 | Status: SHIPPED | OUTPATIENT
Start: 2024-11-11

## 2024-12-02 DIAGNOSIS — E10.9 TYPE 1 DIABETES MELLITUS WITHOUT COMPLICATION: ICD-10-CM

## 2024-12-02 RX ORDER — INSULIN ASPART 100 [IU]/ML
INJECTION, SOLUTION INTRAVENOUS; SUBCUTANEOUS
Qty: 50 ML | Refills: 3 | Status: SHIPPED | OUTPATIENT
Start: 2024-12-02

## 2025-01-20 ENCOUNTER — PATIENT MESSAGE (OUTPATIENT)
Dept: ENDOCRINOLOGY | Facility: CLINIC | Age: 40
End: 2025-01-20
Payer: COMMERCIAL

## 2025-01-27 DIAGNOSIS — E03.9 HYPOTHYROIDISM, UNSPECIFIED TYPE: ICD-10-CM

## 2025-01-27 RX ORDER — LEVOTHYROXINE SODIUM 88 UG/1
88 TABLET ORAL DAILY
Qty: 90 TABLET | Refills: 3 | Status: SHIPPED | OUTPATIENT
Start: 2025-01-27

## 2025-01-31 ENCOUNTER — OFFICE VISIT (OUTPATIENT)
Dept: ENDOCRINOLOGY | Facility: CLINIC | Age: 40
End: 2025-01-31
Payer: COMMERCIAL

## 2025-01-31 ENCOUNTER — PATIENT MESSAGE (OUTPATIENT)
Dept: ENDOCRINOLOGY | Facility: CLINIC | Age: 40
End: 2025-01-31

## 2025-01-31 VITALS — BODY MASS INDEX: 25.62 KG/M2 | HEIGHT: 70 IN | WEIGHT: 179 LBS

## 2025-01-31 DIAGNOSIS — E10.319 TYPE 1 DIABETES MELLITUS WITH RETINOPATHY, MACULAR EDEMA PRESENCE UNSPECIFIED, UNSPECIFIED LATERALITY, UNSPECIFIED RETINOPATHY SEVERITY: Primary | ICD-10-CM

## 2025-01-31 DIAGNOSIS — E03.9 HYPOTHYROIDISM, UNSPECIFIED TYPE: ICD-10-CM

## 2025-01-31 DIAGNOSIS — E10.9 TYPE 1 DIABETES MELLITUS WITHOUT COMPLICATION: ICD-10-CM

## 2025-01-31 PROCEDURE — 98006 SYNCH AUDIO-VIDEO EST MOD 30: CPT | Mod: 95,,, | Performed by: INTERNAL MEDICINE

## 2025-01-31 PROCEDURE — 1159F MED LIST DOCD IN RCRD: CPT | Mod: CPTII,95,, | Performed by: INTERNAL MEDICINE

## 2025-01-31 PROCEDURE — G2211 COMPLEX E/M VISIT ADD ON: HCPCS | Mod: 95,,, | Performed by: INTERNAL MEDICINE

## 2025-01-31 PROCEDURE — 1160F RVW MEDS BY RX/DR IN RCRD: CPT | Mod: CPTII,95,, | Performed by: INTERNAL MEDICINE

## 2025-01-31 PROCEDURE — 95251 CONT GLUC MNTR ANALYSIS I&R: CPT | Mod: NDTC,,, | Performed by: INTERNAL MEDICINE

## 2025-01-31 RX ORDER — GLUCAGON 3 MG/1
1 POWDER NASAL
Qty: 2 EACH | Refills: 3 | Status: SHIPPED | OUTPATIENT
Start: 2025-01-31

## 2025-01-31 RX ORDER — BLOOD-GLUCOSE SENSOR
1 EACH MISCELLANEOUS
Qty: 9 EACH | Refills: 3 | Status: SHIPPED | OUTPATIENT
Start: 2025-01-31 | End: 2026-01-31

## 2025-01-31 NOTE — PROGRESS NOTES
Bisi WoodsRahatTomer is a 40 y.o. female presenting for follow-up of T1DM    The patient location is: [place]  The chief complaint leading to consultation is:   Chief Complaint   Patient presents with    Diabetes    Insulin Pump Therapy    Hypothyroidism       Visit type: audiovisual    Face to Face time with patient: 20 minutes  30 minutes of total time spent on the encounter, which includes face to face time and non-face to face time preparing to see the patient (eg, review of tests), Obtaining and/or reviewing separately obtained history, Documenting clinical information in the electronic or other health record, Independently interpreting results (not separately reported) and communicating results to the patient/family/caregiver, or Care coordination (not separately reported).    Each patient to whom he or she provides medical services by telemedicine is:  (1) informed of the relationship between the physician and patient and the respective role of any other health care provider with respect to management of the patient; and (2) notified that he or she may decline to receive medical services by telemedicine and may withdraw from such care at any time.      History of Present Illness  T1DM  Diagnosed at age 4 yo  Known complications: retinopathy, worsened by pregnancy    She has been on a pump since about age 16. Several different brands. Tried Medtronic 670 G and did not like.     Since last visit has started wellbutrin, now on 300 mg. When she started this noticed more glucose variability particularly spikes overnight. Feels in last 2 weeks this has improved    Weight about 10 pounds higher than her baseline after gain with lexapro last year, recent holidays    Exercise with weights on weekday mornings and will have rise in glucose with this before coffee/breakfast    Drops with walking in hospital or at lunch    Current Diabetes Regimen:  Tslim with control IQ- novolog  Midnight 0.600 u/hr 1u:50 mg/dL 1u:8.0 g 110  "mg/dL  5:30 AM 0.850 u/hr 1u:45 mg/dL 1u:6.5 g 110 mg/dL  7:00 AM 1.000 u/hr 1u:45 mg/dL 1u:6.5 g 110 mg/dL  10:00 AM 0.700 u/hr 1u:45 mg/dL 1u:6.5 g 110 mg/dL  12:00 PM 0.500 u/hr 1u:45 mg/dL 1u:6.5 g 110 mg/dL  2:30 PM 0.600 u/hr 1u:40 mg/dL 1u:6.5 g 110 mg/dL  10:30 PM 0.500 u/hr 1u:50 mg/dL 1u:8.0 g 110 mg/dL     Glucagon: baqsimi  Back-up basal: yes    Uses steel cannula      Last Hgb A1C:  Lab Results   Component Value Date    HGBA1C 6.3 (H) 12/06/2024         Glucose Monitoring:  Meter/CGM: Dexcom with pump data      Hypoglycemic Episodes: as above  DKA: never    Screening / DM Complications:    Nephropathy:  ACEi/ARB: Not taking  Lab Results   Component Value Date    MICALBCREAT Unable to calculate 08/15/2024       Last Lipid Panel:  Statin: crestor 10 mg daily  Strong family history of heart disease including death of her father who had severe disease  6/2024  CAC 0  Lpa 32 (<75)  ApoB 60 (0-79)  LDL 76  HsCRP 2.59 (1-3)  Lab Results   Component Value Date    LDLCALC 59 12/06/2024       Last foot exam : 12/06/2024  Last eye exam : moderate retinopathy; 2/2024      Hypothyroidism  On LT4 88 mcg daily  Lab Results   Component Value Date    TSH 1.56 12/06/2024       No results found for: "TTGIGA"    Aspirin: not taking      Pregnancy plans: none; tubal ligation and later had endometrial ablation        Current Outpatient Medications:     blood sugar diagnostic Strp, , Disp: , Rfl:     buPROPion (WELLBUTRIN XL) 150 MG TB24 tablet, Take 1 tablet (150 mg total) by mouth once daily., Disp: 90 tablet, Rfl: 3    cholecalciferol, vitamin D3, 2,000 unit Cap, Take 1 capsule by mouth once daily., Disp: , Rfl:     levothyroxine (SYNTHROID) 88 MCG tablet, TAKE 1 TABLET (88 MCG TOTAL) BY MOUTH ONCE DAILY., Disp: 90 tablet, Rfl: 3    NOVOLOG U-100 INSULIN ASPART 100 unit/mL injection, INJECT 50 UNITS DAILY AS NEEDED PER INSULIN PUMP, Disp: 50 mL, Rfl: 3    ondansetron (ZOFRAN ODT) 4 MG TbDL, Take 2 tablets (8 mg total) by " mouth every 12 (twelve) hours., Disp: 30 tablet, Rfl: 6    rosuvastatin (CRESTOR) 10 MG tablet, Take 1 tablet (10 mg total) by mouth once daily., Disp: 90 tablet, Rfl: 3    valACYclovir (VALTREX) 500 MG tablet, Take 1 tablet (500 mg total) by mouth once daily., Disp: 90 tablet, Rfl: 3    blood-glucose sensor (DEXCOM G7 SENSOR) Mere, 1 each by Misc.(Non-Drug; Combo Route) route every 10 days., Disp: 9 each, Rfl: 3    glucagon (BAQSIMI) 3 mg/actuation Spry, 1 each by Nasal route as needed., Disp: 2 each, Rfl: 3    ROS as above    Objective:     There were no vitals filed for this visit.  Wt Readings from Last 3 Encounters:   01/31/25 0826 81.2 kg (179 lb)   12/06/24 0755 80.7 kg (178 lb)   03/01/24 0523 78.5 kg (173 lb)         Body mass index is 25.68 kg/m².      Labs    Chemistry        Component Value Date/Time     12/06/2024 0848    K 4.7 12/06/2024 0848     12/06/2024 0848    CO2 32 (H) 12/06/2024 0848    BUN 17 12/06/2024 0848    CREATININE 0.90 12/06/2024 0848     (H) 12/06/2024 0848        Component Value Date/Time    CALCIUM 10.1 12/06/2024 0848    ALKPHOS 56 12/06/2024 0848    AST 30 12/06/2024 0848    ALT 20 12/06/2024 0848    BILITOT 0.6 12/06/2024 0848    ESTGFRAFRICA >60 04/14/2022 0810    EGFRNONAA >60 04/14/2022 0810              Assessment and Plan     Type 1 diabetes mellitus with retinopathy  Pump and sensor download reviewed and with excellent TIR of 87% in last 2 weeks although more variability in last month or so temporally related to starting wellbutrin  Notes rise in glucose with morning exercise as well as lows midday and evening    Will make the following changes to settings:  Patient Instructions   Midnight 0.600 u/hr 1u:50 mg/dL 1u:8.0 g 110 mg/dL  5:30 AM 0.850 u/hr 1u:40 mg/dL 1u:6.5 g 110 mg/dL  7:00 AM 1.000 u/hr 1u:40 mg/dL 1u:6.5 g 110 mg/dL  10:00 AM 0.600 u/hr 1u:45 mg/dL 1u:6.5 g 110 mg/dL  12:00 PM 0.450 u/hr 1u:45 mg/dL 1u:6.5 g 110 mg/dL  2:30 PM 0.525 u/hr  1u:40 mg/dL 1u:6.5 g 110 mg/dL  10:30 PM 0.500 u/hr 1u:50 mg/dL 1u:8.0 g 110 mg/dL       Labs excellent  POC A1c before next visit    Up to date on foot and eye exams    Hypothyroidism  Clinically and biochemically euthyroid.  Cont LT4 88 mcg daily              RTC 4 months       Dulce Maria Gomez MD      Visit today included increased complexity associated with the care of the problems addressed and managing the longitudinal care of the patient due to the serious and/or complex managed problems

## 2025-01-31 NOTE — PATIENT INSTRUCTIONS
Midnight 0.600 u/hr 1u:50 mg/dL 1u:8.0 g 110 mg/dL  5:30 AM 0.850 u/hr 1u:40 mg/dL 1u:6.5 g 110 mg/dL  7:00 AM 1.000 u/hr 1u:40 mg/dL 1u:6.5 g 110 mg/dL  10:00 AM 0.600 u/hr 1u:45 mg/dL 1u:6.5 g 110 mg/dL  12:00 PM 0.450 u/hr 1u:45 mg/dL 1u:6.5 g 110 mg/dL  2:30 PM 0.525 u/hr 1u:40 mg/dL 1u:6.5 g 110 mg/dL  10:30 PM 0.500 u/hr 1u:50 mg/dL 1u:8.0 g 110 mg/dL

## 2025-01-31 NOTE — ASSESSMENT & PLAN NOTE
Pump and sensor download reviewed and with excellent TIR of 87% in last 2 weeks although more variability in last month or so temporally related to starting wellbutrin  Notes rise in glucose with morning exercise as well as lows midday and evening    Will make the following changes to settings:  Patient Instructions   Midnight 0.600 u/hr 1u:50 mg/dL 1u:8.0 g 110 mg/dL  5:30 AM 0.850 u/hr 1u:40 mg/dL 1u:6.5 g 110 mg/dL  7:00 AM 1.000 u/hr 1u:40 mg/dL 1u:6.5 g 110 mg/dL  10:00 AM 0.600 u/hr 1u:45 mg/dL 1u:6.5 g 110 mg/dL  12:00 PM 0.450 u/hr 1u:45 mg/dL 1u:6.5 g 110 mg/dL  2:30 PM 0.525 u/hr 1u:40 mg/dL 1u:6.5 g 110 mg/dL  10:30 PM 0.500 u/hr 1u:50 mg/dL 1u:8.0 g 110 mg/dL       Labs excellent  POC A1c before next visit    Up to date on foot and eye exams

## 2025-05-07 DIAGNOSIS — E03.9 HYPOTHYROIDISM, UNSPECIFIED TYPE: ICD-10-CM

## 2025-05-07 DIAGNOSIS — E10.9 TYPE 1 DIABETES MELLITUS WITHOUT COMPLICATION: ICD-10-CM

## 2025-05-07 DIAGNOSIS — Z82.49 FAMILY HISTORY OF EARLY CAD: Primary | ICD-10-CM

## 2025-05-07 RX ORDER — INSULIN ASPART 100 [IU]/ML
INJECTION, SOLUTION INTRAVENOUS; SUBCUTANEOUS
Qty: 50 ML | Refills: 3 | Status: SHIPPED | OUTPATIENT
Start: 2025-05-07

## 2025-05-07 RX ORDER — ROSUVASTATIN CALCIUM 10 MG/1
10 TABLET, COATED ORAL DAILY
Qty: 90 TABLET | Refills: 3 | Status: SHIPPED | OUTPATIENT
Start: 2025-05-07 | End: 2026-05-07

## 2025-05-07 RX ORDER — LEVOTHYROXINE SODIUM 88 UG/1
88 TABLET ORAL DAILY
Qty: 90 TABLET | Refills: 3 | Status: SHIPPED | OUTPATIENT
Start: 2025-05-07

## 2025-05-30 ENCOUNTER — PATIENT MESSAGE (OUTPATIENT)
Dept: ENDOCRINOLOGY | Facility: CLINIC | Age: 40
End: 2025-05-30

## 2025-05-30 ENCOUNTER — OFFICE VISIT (OUTPATIENT)
Dept: ENDOCRINOLOGY | Facility: CLINIC | Age: 40
End: 2025-05-30
Payer: COMMERCIAL

## 2025-05-30 DIAGNOSIS — E10.3393 MODERATE NONPROLIFERATIVE DIABETIC RETINOPATHY OF BOTH EYES WITHOUT MACULAR EDEMA ASSOCIATED WITH TYPE 1 DIABETES MELLITUS: ICD-10-CM

## 2025-05-30 DIAGNOSIS — E10.9 TYPE 1 DIABETES MELLITUS WITHOUT COMPLICATION: Primary | ICD-10-CM

## 2025-05-30 DIAGNOSIS — E03.9 HYPOTHYROIDISM, UNSPECIFIED TYPE: ICD-10-CM

## 2025-05-30 DIAGNOSIS — Z82.49 FAMILY HISTORY OF EARLY CAD: ICD-10-CM

## 2025-05-30 DIAGNOSIS — E10.319 TYPE 1 DIABETES MELLITUS WITH RETINOPATHY, MACULAR EDEMA PRESENCE UNSPECIFIED, UNSPECIFIED LATERALITY, UNSPECIFIED RETINOPATHY SEVERITY: ICD-10-CM

## 2025-05-30 PROCEDURE — 98006 SYNCH AUDIO-VIDEO EST MOD 30: CPT | Mod: 95,,, | Performed by: INTERNAL MEDICINE

## 2025-05-30 PROCEDURE — 95251 CONT GLUC MNTR ANALYSIS I&R: CPT | Mod: NDTC,,, | Performed by: INTERNAL MEDICINE

## 2025-05-30 PROCEDURE — 3044F HG A1C LEVEL LT 7.0%: CPT | Mod: CPTII,95,, | Performed by: INTERNAL MEDICINE

## 2025-05-30 PROCEDURE — G2211 COMPLEX E/M VISIT ADD ON: HCPCS | Mod: 95,,, | Performed by: INTERNAL MEDICINE

## 2025-05-30 PROCEDURE — 1159F MED LIST DOCD IN RCRD: CPT | Mod: CPTII,95,, | Performed by: INTERNAL MEDICINE

## 2025-05-30 PROCEDURE — 1160F RVW MEDS BY RX/DR IN RCRD: CPT | Mod: CPTII,95,, | Performed by: INTERNAL MEDICINE

## 2025-06-03 ENCOUNTER — PATIENT MESSAGE (OUTPATIENT)
Dept: ENDOCRINOLOGY | Facility: CLINIC | Age: 40
End: 2025-06-03
Payer: COMMERCIAL

## 2025-06-03 RX ORDER — PROMETHAZINE HYDROCHLORIDE 12.5 MG/1
12.5 TABLET ORAL EVERY 6 HOURS PRN
Qty: 20 TABLET | Refills: 0 | Status: SHIPPED | OUTPATIENT
Start: 2025-06-03

## 2025-06-03 RX ORDER — ONDANSETRON 4 MG/1
4 TABLET, ORALLY DISINTEGRATING ORAL EVERY 12 HOURS PRN
Qty: 20 TABLET | Refills: 0 | Status: SHIPPED | OUTPATIENT
Start: 2025-06-03

## 2025-06-18 ENCOUNTER — TELEPHONE (OUTPATIENT)
Dept: FAMILY MEDICINE | Facility: CLINIC | Age: 40
End: 2025-06-18
Payer: COMMERCIAL

## 2025-06-18 DIAGNOSIS — R23.3 PETECHIAE: Primary | ICD-10-CM

## 2025-08-12 ENCOUNTER — PATIENT MESSAGE (OUTPATIENT)
Dept: ENDOCRINOLOGY | Facility: CLINIC | Age: 40
End: 2025-08-12
Payer: COMMERCIAL

## 2025-08-25 DIAGNOSIS — E03.9 HYPOTHYROIDISM, UNSPECIFIED TYPE: ICD-10-CM

## 2025-08-25 DIAGNOSIS — E10.9 TYPE 1 DIABETES MELLITUS WITHOUT COMPLICATION: ICD-10-CM

## 2025-08-25 DIAGNOSIS — Z82.49 FAMILY HISTORY OF EARLY CAD: ICD-10-CM

## 2025-08-26 RX ORDER — LEVOTHYROXINE SODIUM 88 UG/1
88 TABLET ORAL DAILY
Qty: 90 TABLET | Refills: 3 | Status: SHIPPED | OUTPATIENT
Start: 2025-08-26

## 2025-08-26 RX ORDER — ROSUVASTATIN CALCIUM 10 MG/1
10 TABLET, COATED ORAL DAILY
Qty: 90 TABLET | Refills: 3 | Status: SHIPPED | OUTPATIENT
Start: 2025-08-26 | End: 2026-08-26

## 2025-08-26 RX ORDER — INSULIN ASPART 100 [IU]/ML
INJECTION, SOLUTION INTRAVENOUS; SUBCUTANEOUS
Qty: 50 ML | Refills: 3 | Status: SHIPPED | OUTPATIENT
Start: 2025-08-26